# Patient Record
Sex: FEMALE | Race: WHITE | NOT HISPANIC OR LATINO | URBAN - NONMETROPOLITAN AREA
[De-identification: names, ages, dates, MRNs, and addresses within clinical notes are randomized per-mention and may not be internally consistent; named-entity substitution may affect disease eponyms.]

---

## 2017-06-19 PROBLEM — Z96.1: Noted: 2019-01-02

## 2017-06-19 PROBLEM — E11.9: Noted: 2017-06-19

## 2017-06-19 PROBLEM — H26.493: Noted: 2019-01-02

## 2018-08-16 NOTE — PATIENT DISCUSSION
No evidence of any ocular trauma in the left eye. Recommend patient use Prednisolone drops TID and Atropine drops at bedtime in her left eye. I will recheck her again in one month.

## 2019-01-02 ENCOUNTER — IMPORTED ENCOUNTER (OUTPATIENT)
Dept: URBAN - NONMETROPOLITAN AREA CLINIC 1 | Facility: CLINIC | Age: 84
End: 2019-01-02

## 2019-01-02 PROCEDURE — 92014 COMPRE OPH EXAM EST PT 1/>: CPT

## 2019-01-02 PROCEDURE — 92015 DETERMINE REFRACTIVE STATE: CPT

## 2019-01-02 PROCEDURE — 92250 FUNDUS PHOTOGRAPHY W/I&R: CPT

## 2019-01-02 NOTE — PATIENT DISCUSSION
COAG-Appears stable. -IOPs 16 OU today-Currently off gtts-Order Fundus Photos today.-Order VF 24-2-Order ON OCT -RTC 3 months TA Getachew AVALOS noted today.-Stressed the importance of keeping blood sugars under control and regular visits with PCP. -Explained the possible effects of poorly controlled diabetes and the damage that diabetes can cause to ocular health. -AODM x 1998.-A1C was 6% in April.-Pt instructed to contact our office with any vision changes.-Letter to Dr. Leonor Beyer symptoms of advancing PCO. -Continue to monitor for now. Pt will notify us if any new symptoms develop.; Dr's Notes: d/c Latanoprost OU. Pt does not tolerate b/o severe ksicca. 4/23/15 cornea has heavy SPK very disruptived surface.

## 2019-02-27 ENCOUNTER — IMPORTED ENCOUNTER (OUTPATIENT)
Dept: URBAN - NONMETROPOLITAN AREA CLINIC 1 | Facility: CLINIC | Age: 84
End: 2019-02-27

## 2019-02-27 PROCEDURE — 92083 EXTENDED VISUAL FIELD XM: CPT

## 2019-02-27 PROCEDURE — 92133 CPTRZD OPH DX IMG PST SGM ON: CPT

## 2019-02-27 NOTE — PATIENT DISCUSSION
VF 24-2 and OCT ONH done today 2/27/19 -JMSCOAG-Appears stable. -IOPs 16 OU today-Currently off gtts-Order Fundus Photos today.-Order VF 24-2-Order ON OCT -RTC 3 months TA Getachew AVALOS noted today.-Stressed the importance of keeping blood sugars under control and regular visits with PCP. -Explained the possible effects of poorly controlled diabetes and the damage that diabetes can cause to ocular health. -AODM x 1998.-A1C was 6% in April.-Pt instructed to contact our office with any vision changes.-Letter to Dr. Edward Norwood symptoms of advancing PCO. -Continue to monitor for now. Pt will notify us if any new symptoms develop.; Dr's Notes: d/c Latanoprost OU. Pt does not tolerate b/o severe ksicca. 4/23/15 cornea has heavy SPK very disruptived surface.

## 2022-04-09 ASSESSMENT — TONOMETRY
OD_IOP_MMHG: 16
OS_IOP_MMHG: 16

## 2022-04-09 ASSESSMENT — VISUAL ACUITY
OS_SC: 20/40+2
OD_SC: 20/40

## 2022-04-09 ASSESSMENT — PACHYMETRY
OD_CT_UM: 464; ADJ: VTHIN
OS_CT_UM: 461; ADJ: VTHIN

## 2022-06-27 ENCOUNTER — NEW PATIENT (OUTPATIENT)
Dept: RURAL CLINIC 1 | Facility: CLINIC | Age: 87
End: 2022-06-27

## 2022-06-27 DIAGNOSIS — E11.9: ICD-10-CM

## 2022-06-27 DIAGNOSIS — H40.1132: ICD-10-CM

## 2022-06-27 PROCEDURE — 92004 COMPRE OPH EXAM NEW PT 1/>: CPT

## 2022-06-27 ASSESSMENT — VISUAL ACUITY
OD_CC: 20/40-2
OS_CC: 20/40-2

## 2022-06-27 ASSESSMENT — TONOMETRY
OD_IOP_MMHG: 14
OS_IOP_MMHG: 12

## 2022-06-27 NOTE — PATIENT DISCUSSION
VF 24-2 and OCT ONH done today 2/27/19 -JMSCOAG-Appears stable. -IOPs 16 OU today-Currently off gtts-Order Fundus Photos today.-Order VF 24-2-Order ON OCT -RTC 3 months TA Getachew AVALOS noted today.-Stressed the importance of keeping blood sugars under control and regular visits with PCP. -Explained the possible effects of poorly controlled diabetes and the damage that diabetes can cause to ocular health. -AODM x 1998.-A1C was 6% in April.-Pt instructed to contact our office with any vision changes.-Letter to Dr. Desiree Orellana symptoms of advancing PCO. -Continue to monitor for now. Pt will notify us if any new symptoms develop.; Dr's Notes: d/c Latanoprost OU. Pt does not tolerate b/o severe ksicca. 4/23/15 cornea has heavy SPK very disruptived surface.